# Patient Record
Sex: MALE | URBAN - METROPOLITAN AREA
[De-identification: names, ages, dates, MRNs, and addresses within clinical notes are randomized per-mention and may not be internally consistent; named-entity substitution may affect disease eponyms.]

---

## 2019-01-29 ENCOUNTER — COMMUNICATION - HEALTHEAST (OUTPATIENT)
Dept: SCHEDULING | Facility: CLINIC | Age: 24
End: 2019-01-29

## 2021-06-23 NOTE — TELEPHONE ENCOUNTER
"PCP: SHEILA BLANKENSHIP.    was out in the cold approx 6pm tonight for about 5 minutes.  The fingers on his left hand became red and started to swell, caller thinks he is developing blisters that are dark inside. The fingers are still red and he says it feels like they have been burned. Pain=\"2-3\". The tip of his pinky finger is numb.     Reason for Disposition    [1] After 1 hour of rewarming AND [2] color and sensation don't return to normal    [1] Frostbite blisters AND [2] contain bloody fluid    Protocols used: FROSTBITE-A-AH    Triaged to a disposition of Go to ED now-See physician within 4 hrs (or PCP triage).   Weather is even colder now than when he was out earlier tonight. Advised caller to contact tammy HUSAIN for his clinic now and discuss.     Dana Echevarria RN [facility] Triage Nurse  "